# Patient Record
(demographics unavailable — no encounter records)

---

## 2025-03-31 NOTE — PHYSICAL EXAM
[No Acute Distress] : no acute distress [Well Nourished] : well nourished [Well Developed] : well developed [Well-Appearing] : well-appearing [Normal Sclera/Conjunctiva] : normal sclera/conjunctiva [PERRL] : pupils equal round and reactive to light [EOMI] : extraocular movements intact [Normal Outer Ear/Nose] : the outer ears and nose were normal in appearance [Normal Oropharynx] : the oropharynx was normal [Normal TMs] : both tympanic membranes were normal [No JVD] : no jugular venous distention [No Lymphadenopathy] : no lymphadenopathy [Supple] : supple [Thyroid Normal, No Nodules] : the thyroid was normal and there were no nodules present [No Respiratory Distress] : no respiratory distress  [No Accessory Muscle Use] : no accessory muscle use [Clear to Auscultation] : lungs were clear to auscultation bilaterally [Normal Rate] : normal rate  [Regular Rhythm] : with a regular rhythm [Normal S1, S2] : normal S1 and S2 [No Murmur] : no murmur heard [No Carotid Bruits] : no carotid bruits [No Abdominal Bruit] : a ~M bruit was not heard ~T in the abdomen [No Varicosities] : no varicosities [Pedal Pulses Present] : the pedal pulses are present [No Edema] : there was no peripheral edema [No Palpable Aorta] : no palpable aorta [No Extremity Clubbing/Cyanosis] : no extremity clubbing/cyanosis [Normal Appearance] : normal in appearance [No Nipple Discharge] : no nipple discharge [No Axillary Lymphadenopathy] : no axillary lymphadenopathy [Soft] : abdomen soft [Non Tender] : non-tender [Non-distended] : non-distended [No Masses] : no abdominal mass palpated [No HSM] : no HSM [Normal Bowel Sounds] : normal bowel sounds [Normal Supraclavicular Nodes] : no supraclavicular lymphadenopathy [Normal Axillary Nodes] : no axillary lymphadenopathy [Normal Posterior Cervical Nodes] : no posterior cervical lymphadenopathy [Normal Anterior Cervical Nodes] : no anterior cervical lymphadenopathy [No CVA Tenderness] : no CVA  tenderness [No Spinal Tenderness] : no spinal tenderness [No Joint Swelling] : no joint swelling [Grossly Normal Strength/Tone] : grossly normal strength/tone [No Rash] : no rash [Coordination Grossly Intact] : coordination grossly intact [No Focal Deficits] : no focal deficits [Normal Gait] : normal gait [Deep Tendon Reflexes (DTR)] : deep tendon reflexes were 2+ and symmetric [Normal Affect] : the affect was normal [Alert and Oriented x3] : oriented to person, place, and time [Normal Insight/Judgement] : insight and judgment were intact [FreeTextEntry1] : Deferred to GYN [de-identified] : Deferred to GYN

## 2025-03-31 NOTE — HEALTH RISK ASSESSMENT
[Good] : ~his/her~  mood as  good [2 - 4 times a month (2 pts)] : 2-4 times a month (2 points) [1 or 2 (0 pts)] : 1 or 2 (0 points) [Never (0 pts)] : Never (0 points) [No] : In the past 12 months have you used drugs other than those required for medical reasons? No [No falls in past year] : Patient reported no falls in the past year [PHQ-2 Negative - No further assessment needed] : PHQ-2 Negative - No further assessment needed [None] : Patient does not have any barriers to medication adherence [Yes] : Reviewed medication list for presence of high-risk medications. [Benzodiazepines] : benzodiazepines [Opioids] : opioids [Blood Thinners] : blood thinners [Muscle Relaxants] : muscle relaxants [Sedatives] : sedatives [Never] : Never [NO] : No [HIV test declined] : HIV test declined [Hepatitis C test declined] : Hepatitis C test declined [With Family] : lives with family [Retired] : retired [] :  [Feels Safe at Home] : Feels safe at home [Fully functional (bathing, dressing, toileting, transferring, walking, feeding)] : Fully functional (bathing, dressing, toileting, transferring, walking, feeding) [Fully functional (using the telephone, shopping, preparing meals, housekeeping, doing laundry, using] : Fully functional and needs no help or supervision to perform IADLs (using the telephone, shopping, preparing meals, housekeeping, doing laundry, using transportation, managing medications and managing finances) [Smoke Detector] : smoke detector [Carbon Monoxide Detector] : carbon monoxide detector [Seat Belt] :  uses seat belt [Sunscreen] : uses sunscreen [de-identified] : Elliptical, weights,walking [de-identified] : Healthy [Change in mental status noted] : No change in mental status noted [Language] : denies difficulty with language [Behavior] : denies difficulty with behavior [Learning/Retaining New Information] : denies difficulty learning/retaining new information [Handling Complex Tasks] : denies difficulty handling complex tasks [Reasoning] : denies difficulty with reasoning [Spatial Ability and Orientation] : denies difficulty with spatial ability and orientation

## 2025-03-31 NOTE — HISTORY OF PRESENT ILLNESS
[FreeTextEntry1] : Well visit Had vertigo - saw ENT - Epley maneuver - ? if helped MRI was OK Did see neurology - did not do all the work-up. Going to see cardiology

## 2025-03-31 NOTE — ASSESSMENT
[FreeTextEntry1] :  Pt for well exam. I have advised the patient should consider getting the following vaccinations: Up to date Labs reviewed. Health counseling given. Xanax renewed.

## 2025-04-25 NOTE — HISTORY OF PRESENT ILLNESS
[FreeTextEntry1] : 62YO F with histoyr of angle closure glaucoma, BPPV, and history of syncope, presenting to establish care given her mother had a heart valve issue and she has occassional heart palpatations due to health anxiety. Patient has no HA, Chest pain, SOB, swelling. Her syncopal episodes have happened 5x in the past 30 years and occur when the patient is sick. She will feel lightheaded right before she passes out. Recent calcium score of zero.   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 Daughter recenlty had second child  VS: 122/84 HR 91  Lipid panel (3/18/2023):  Cholesterol 196 Non-hdl 134 HDL 62 TG 53  EKG (25): NS @ 86  Holter in  Last echo 2016: EF 59% Last stress test 2016

## 2025-04-25 NOTE — END OF VISIT
[] : A student assisted with documenting this visit. I have reviewed and verified all information documented by the student, and made modifications to such information, when appropriate. [Time Spent: ___ minutes] : I have spent [unfilled] minutes of time on the encounter which excludes teaching and separately reported services. [FreeTextEntry3] :   I, Dr. April Galdamez, personally performed the evaluation and management (E/M) services for this new patient.  That E/M includes conducting the initial examination, assessing all conditions, and establishing the plan of care.  Today, my medical student, was here to observe my evaluation and management services for this patient to be followed going forward.

## 2025-06-03 NOTE — DISCUSSION/SUMMARY
[FreeTextEntry1] : 62YO F with history of angle closure glaucoma, BPPV, and history of syncope, presenting to follow up given her mother had a heart valve issue and she has occasional heart palpitations due to health anxiety. Patient has no HA, Chest pain, SOB, swelling. Her syncopal episodes have happened 5x in the past 30 years and occur when the patient is sick. She will feel lightheaded right before she passes out. Recent calcium score of zero.   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 Daughter recenlty had second child  VS: 122/84 HR 91  Lipid panel (3/18/2023):  Cholesterol 196 Non-hdl 134 HDL 62 TG 53  EKG (25): NS @ 86  Holter in  Last echo 2016: EF 59% Last stress test 2016  interval note 6/3/25  spoke to pt at length about the result of the stress test, the echo and the carotid dopplers in light of the minimal plaque noted on her carotid US, pt needs to be started on crestor with a new LDL goal of < 70 no further cardiac work up needed at this time will need to repeat her blood work in 3 months to reassess her lipids  BP stable Encouraged the patient to monitor blood pressure at home, keep a log, and report results back to us for evaluation. Based on results, we will adjust the regimen as necessary. ECG with no acute ischemic changes (stable from prior) will refer for echo and stress test and a possible carotid dopplers - all reviewed with the patient started on crestor 5 mg 3x a week with coQ 10 enzyme Encouraged the patient to find healthy outlets and coping mechanisms to help manage stress, such as physical activity/exercise, reducing workload if possible, spending time with family and friends, engaging in an enjoyable hobby, or using meditation or mindfulness techniques. Encouraged patient to continue healthy exercise and eating habits, focusing on a Mediterranean style of eating and aiming for the recommended 150 minutes per week of moderate physical activity.

## 2025-06-03 NOTE — HISTORY OF PRESENT ILLNESS
[Home] : at home, [unfilled] , at the time of the visit. [Medical Office: ([facility])___] : at the medical office located in  [Telehealth (audio & video)] : This visit was provided via telehealth using real-time 2-way audio visual technology. [Verbal consent obtained from patient] : the patient, [unfilled] [FreeTextEntry1] : 62YO F with history of angle closure glaucoma, BPPV, and history of syncope, presenting to follow up given her mother had a heart valve issue and she has occasional heart palpitations due to health anxiety. Patient has no HA, Chest pain, SOB, swelling. Her syncopal episodes have happened 5x in the past 30 years and occur when the patient is sick. She will feel lightheaded right before she passes out. Recent calcium score of zero.   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 Daughter recenlty had second child  VS: 122/84 HR 91  Lipid panel (3/18/2023):  Cholesterol 196 Non-hdl 134 HDL 62 TG 53  EKG (25): NS @ 86  Holter in  Last echo 2016: EF 59% Last stress test 2016  interval note 6/3/25  spoke to pt at length about the result of the stress test, the echo and the carotid dopplers in light of the minimal plaque noted on her carotid US, pt needs to be started on crestor with a new LDL goal of < 70 no further cardiac work up needed at this time will need to repeat her blood work in 3 months to reassess her lipids

## 2025-07-29 NOTE — HISTORY OF PRESENT ILLNESS
[Patient reported mammogram was normal] : Patient reported mammogram was normal [Patient reported breast sonogram was normal] : Patient reported breast sonogram was normal [Patient reported colonoscopy was normal] : Patient reported colonoscopy was normal [FreeTextEntry1] : 2025. MINNA HOPKINS 63-year-old female  LMP . She presents for an annual gyn exam.  Patient reports vaginal atrophy has significantly improved w/ Vagifem intravaginally twice a week and Estradiol cream externally three times a week.   She denies abdominal and pelvic pain. No abnormal vaginal bleeding, vaginal discharge, or vaginitis symptoms. She reports normal urination, no dysuria, no incontinence of urine. BM is normal per patient, no blood in stool or constipation/diarrhea.  Followed by breast surgeon Dr. Barker. Bone health managed by Dr. Peoples.  No new medical conditions, medications, or surgeries since last visit.  GynHx: vaginal atrophy PMHx: osteoporosis, osteopenia, glaucoma SHx: laser glaucoma, wisdom teeth extraction, endometrial biopsy  FHx: mother and PGM w/ breast ca. Denies FHx of uterine, ovarian, or colon cancer. Meds: Vagifem, Estradiol, Colace, Crestor, multivitamins All: NKDA [Mammogramdate] : 03/25 [BreastSonogramDate] : 03/25 [BoneDensityDate] : 07/23 [ColonoscopyDate] : 04/22

## 2025-07-29 NOTE — SIGNATURES
[TextEntry] : This note was authored by Samuel Tuttle working as a scribe for Dr. Pat Leal.   I, Dr. Pat Leal, have reviewed the content of this note and confirm it is true and accurate. I personally performed the history and physical examination and made all the decisions. 07/29/2025

## 2025-07-29 NOTE — PHYSICAL EXAM
[Chaperoned Physical Exam] : A chaperone was present in the examining room during all aspects of the physical examination. [Appropriately responsive] : appropriately responsive [Alert] : alert [No Acute Distress] : no acute distress [No Lymphadenopathy] : no lymphadenopathy [Regular Rate Rhythm] : regular rate rhythm [No Murmurs] : no murmurs [Clear to Auscultation B/L] : clear to auscultation bilaterally [Soft] : soft [Non-tender] : non-tender [Non-distended] : non-distended [No HSM] : No HSM [No Lesions] : no lesions [No Mass] : no mass [Oriented x3] : oriented x3 [Examination Of The Breasts] : a normal appearance [No Masses] : no breast masses were palpable [Labia Majora] : normal [Labia Minora] : normal [Normal] : normal [Uterine Adnexae] : normal [Vulvar Atrophy] : vulvar atrophy [Atrophy] : atrophy [FreeTextEntry2] : Samuel Tuttle [FreeTextEntry1] : medical scribe [FreeTextEntry9] : Guaiac negative, no masses

## 2025-07-29 NOTE — PLAN
[FreeTextEntry1] : Routine Gyn Exam: GynHx: vaginal atrophy, PMHx: osteoporosis, osteopenia, SHx: endometrial biopsy 08/22, FHx: mother and PGM w/ breast ca BSE taught. Pap smear conducted. Mammogram and breast sonogram due 03/26 (last 03/25) Advised pt. to schedule colonoscopy 04/27 (last 04/22) Bone density schedule in 07/25 (last 07/23). Bone health managed by Dr. Peoples.  Vaginal Atrophy: Continue Vagifem intravaginally twice a week Continue Estradiol cream externally nightly  RTO in 1 year or PRN.